# Patient Record
(demographics unavailable — no encounter records)

---

## 2024-10-31 NOTE — HISTORY OF PRESENT ILLNESS
[FreeTextEntry1] : 57yo  (LMP 2016- h/o IFRAH/LSO for endometrial cancer, followed by full pelvic radiation in [facility]) presents to establish care/ annual exam. Pt moved here over a year ago and has not been to a gyn since - where she has a form from a [facility] MD stating her cancer is in remission.     Pt denies VB/Pain/ changes in bowel or bladder habits  +vag dryness Not sex active-   struggling with liver cancer.   Obhx: 2x , 1 sab GYhx:  +endometrial ca (h/o regular menses until mid 40s when she experienced AUB),  Denies abnl paps/ stds.  Last pap? prior to covid.  Mammo 2024: Birads 1 ([facility]) Medhx: fatty liver (recent dx per pt) Shx:   IFRAH/Lt oophorectomy/BS Meds: none ALL: NKDA Sochx: denies Famhx: denies any cancers/ thromboembolic events  PHQ9: 0 Colonoscopy- never, has referral, has already rescheduled Gen [facility] followed by / Rylan ([place])- pt states she was there last month and has follow up in 3 months

## 2024-10-31 NOTE — PHYSICAL EXAM
[Appropriately responsive] : appropriately responsive [Alert] : alert [No Acute Distress] : no acute distress [No Lymphadenopathy] : no lymphadenopathy [Regular Rate Rhythm] : regular rate rhythm [Soft] : soft [Non-tender] : non-tender [Non-distended] : non-distended [No HSM] : No HSM [No Lesions] : no lesions [No Mass] : no mass [Oriented x3] : oriented x3 [FreeTextEntry7] : vertical scar noted  [Examination Of The Breasts] : a normal appearance [No Masses] : no breast masses were palpable [Vulvar Atrophy] : vulvar atrophy [Labia Majora] : normal [Labia Minora] : normal [Normal] : normal [Atrophy] : atrophy [Absent] : absent [Uterine Adnexae] : non-palpable [FreeTextEntry4] : tight vagina/ shortened

## 2024-10-31 NOTE — DISCUSSION/SUMMARY
[FreeTextEntry1] : 57yo P2- new pt to establish care  # h/o endometrial cancer (s/p IFRAH/LO/BS and pelvic radiation) - [place] 2016 - [ ]onc referral to establish care/ survivorship surveillance  # Gen - [ ]vag pap ( hyst done for pathology- will cont to pap) - mammo up to date 17973 ( [facility]) - Colonoscopy- pt has referral and states she will reschedule - PHQ9 reviewed face to face, no evidence of depression WIll f/u for changes in mood/ anxiety - gen health followed by PCP  RTC for [facility], PAC